# Patient Record
Sex: FEMALE | Race: WHITE | NOT HISPANIC OR LATINO | Employment: FULL TIME | URBAN - METROPOLITAN AREA
[De-identification: names, ages, dates, MRNs, and addresses within clinical notes are randomized per-mention and may not be internally consistent; named-entity substitution may affect disease eponyms.]

---

## 2023-08-29 ENCOUNTER — OFFICE VISIT (OUTPATIENT)
Dept: PRIMARY CARE | Facility: CLINIC | Age: 38
End: 2023-08-29
Payer: COMMERCIAL

## 2023-08-29 ENCOUNTER — LAB (OUTPATIENT)
Dept: LAB | Facility: LAB | Age: 38
End: 2023-08-29
Payer: COMMERCIAL

## 2023-08-29 VITALS
HEIGHT: 66 IN | BODY MASS INDEX: 21.86 KG/M2 | DIASTOLIC BLOOD PRESSURE: 74 MMHG | SYSTOLIC BLOOD PRESSURE: 130 MMHG | WEIGHT: 136 LBS

## 2023-08-29 DIAGNOSIS — Z00.00 HEALTH CARE MAINTENANCE: ICD-10-CM

## 2023-08-29 DIAGNOSIS — M85.80 OSTEOPENIA, UNSPECIFIED LOCATION: ICD-10-CM

## 2023-08-29 DIAGNOSIS — R53.83 OTHER FATIGUE: ICD-10-CM

## 2023-08-29 DIAGNOSIS — Z00.00 HEALTHCARE MAINTENANCE: ICD-10-CM

## 2023-08-29 DIAGNOSIS — R53.83 OTHER FATIGUE: Primary | ICD-10-CM

## 2023-08-29 DIAGNOSIS — D64.9 ANEMIA, UNSPECIFIED TYPE: ICD-10-CM

## 2023-08-29 DIAGNOSIS — Z00.00 ENCOUNTER FOR PREVENTIVE HEALTH EXAMINATION: ICD-10-CM

## 2023-08-29 LAB
ALANINE AMINOTRANSFERASE (SGPT) (U/L) IN SER/PLAS: 9 U/L (ref 7–45)
ALBUMIN (G/DL) IN SER/PLAS: 4.6 G/DL (ref 3.4–5)
ALKALINE PHOSPHATASE (U/L) IN SER/PLAS: 39 U/L (ref 33–110)
ANION GAP IN SER/PLAS: 12 MMOL/L (ref 10–20)
ASPARTATE AMINOTRANSFERASE (SGOT) (U/L) IN SER/PLAS: 13 U/L (ref 9–39)
BILIRUBIN TOTAL (MG/DL) IN SER/PLAS: 0.6 MG/DL (ref 0–1.2)
CALCIUM (MG/DL) IN SER/PLAS: 9.5 MG/DL (ref 8.6–10.6)
CARBON DIOXIDE, TOTAL (MMOL/L) IN SER/PLAS: 28 MMOL/L (ref 21–32)
CHLORIDE (MMOL/L) IN SER/PLAS: 102 MMOL/L (ref 98–107)
COBALAMIN (VITAMIN B12) (PG/ML) IN SER/PLAS: 335 PG/ML (ref 211–911)
CREATININE (MG/DL) IN SER/PLAS: 0.9 MG/DL (ref 0.5–1.05)
ERYTHROCYTE DISTRIBUTION WIDTH (RATIO) BY AUTOMATED COUNT: 11.9 % (ref 11.5–14.5)
ERYTHROCYTE MEAN CORPUSCULAR HEMOGLOBIN CONCENTRATION (G/DL) BY AUTOMATED: 35.4 G/DL (ref 32–36)
ERYTHROCYTE MEAN CORPUSCULAR VOLUME (FL) BY AUTOMATED COUNT: 91 FL (ref 80–100)
ERYTHROCYTES (10*6/UL) IN BLOOD BY AUTOMATED COUNT: 4.2 X10E12/L (ref 4–5.2)
ESTIMATED AVERAGE GLUCOSE FOR HBA1C: 94 MG/DL
FERRITIN (UG/LL) IN SER/PLAS: 66 UG/L (ref 8–150)
GFR FEMALE: 83 ML/MIN/1.73M2
GLUCOSE (MG/DL) IN SER/PLAS: 85 MG/DL (ref 74–99)
HEMATOCRIT (%) IN BLOOD BY AUTOMATED COUNT: 38.4 % (ref 36–46)
HEMOGLOBIN (G/DL) IN BLOOD: 13.6 G/DL (ref 12–16)
HEMOGLOBIN A1C/HEMOGLOBIN TOTAL IN BLOOD: 4.9 %
IRON (UG/DL) IN SER/PLAS: 92 UG/DL (ref 35–150)
IRON BINDING CAPACITY (UG/DL) IN SER/PLAS: 286 UG/DL (ref 240–445)
IRON SATURATION (%) IN SER/PLAS: 32 % (ref 25–45)
LEUKOCYTES (10*3/UL) IN BLOOD BY AUTOMATED COUNT: 6.5 X10E9/L (ref 4.4–11.3)
NRBC (PER 100 WBCS) BY AUTOMATED COUNT: 0 /100 WBC (ref 0–0)
PLATELETS (10*3/UL) IN BLOOD AUTOMATED COUNT: 150 X10E9/L (ref 150–450)
POTASSIUM (MMOL/L) IN SER/PLAS: 4.5 MMOL/L (ref 3.5–5.3)
PROTEIN TOTAL: 6.8 G/DL (ref 6.4–8.2)
SODIUM (MMOL/L) IN SER/PLAS: 137 MMOL/L (ref 136–145)
THYROTROPIN (MIU/L) IN SER/PLAS BY DETECTION LIMIT <= 0.05 MIU/L: 1.88 MIU/L (ref 0.44–3.98)
UREA NITROGEN (MG/DL) IN SER/PLAS: 16 MG/DL (ref 6–23)

## 2023-08-29 PROCEDURE — 83550 IRON BINDING TEST: CPT

## 2023-08-29 PROCEDURE — 82607 VITAMIN B-12: CPT

## 2023-08-29 PROCEDURE — 36415 COLL VENOUS BLD VENIPUNCTURE: CPT

## 2023-08-29 PROCEDURE — 84443 ASSAY THYROID STIM HORMONE: CPT

## 2023-08-29 PROCEDURE — 80053 COMPREHEN METABOLIC PANEL: CPT

## 2023-08-29 PROCEDURE — 83540 ASSAY OF IRON: CPT

## 2023-08-29 PROCEDURE — 99396 PREV VISIT EST AGE 40-64: CPT | Performed by: STUDENT IN AN ORGANIZED HEALTH CARE EDUCATION/TRAINING PROGRAM

## 2023-08-29 PROCEDURE — 85027 COMPLETE CBC AUTOMATED: CPT

## 2023-08-29 PROCEDURE — 82728 ASSAY OF FERRITIN: CPT

## 2023-08-29 PROCEDURE — 1036F TOBACCO NON-USER: CPT | Performed by: STUDENT IN AN ORGANIZED HEALTH CARE EDUCATION/TRAINING PROGRAM

## 2023-08-29 PROCEDURE — 83036 HEMOGLOBIN GLYCOSYLATED A1C: CPT

## 2023-08-29 NOTE — PROGRESS NOTES
"Subjective   Patient ID: Karyn Mcgill is a 38 y.o. female who presents for Annual Exam.    HPI   Yearly physical.    She feels well.    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    Exercising regularly, runs.    She feels fatigued at end of the day. Has 4 kids, works full time, very active. Mood is generally good.  Review of Systems  12-point ROS reviewed and was negative unless otherwise noted in HPI.    Objective   /74   Ht 1.676 m (5' 6\")   Wt 61.7 kg (136 lb)   BMI 21.95 kg/m²     EXAM:  GEN: conversant, NAD  HEENT: PERRL, EOMI, MMM, OP clear  NECK: supple, no carotid bruits appreciated b/l  CV: S1, S2, RRR  PULM: CTAB  ABD: soft, NT, ND  NEURO: no new gross focal deficits  EXT: no sig LE edema  PSYCH: appropriate affect      Assessment/Plan     #Osteopenia: She takes calcium+D, check vit D level.     #Asthma: Stable.     #HLD: Mild, discussed lifestyle modifications.      #Scoliosis: follows with chiropractor    #Fatigue: check routine labs including CBC, iron studies, B12, TSH.     #Health maintenance: Mammogram ordered. TDaP in 2015. Advised yearly flu shot. Received COVID-19 vaccine. Seeing GYN for routine care. Routine labs.     RTC 1 year     "

## 2024-03-25 ENCOUNTER — OFFICE VISIT (OUTPATIENT)
Dept: PRIMARY CARE | Facility: CLINIC | Age: 39
End: 2024-03-25
Payer: COMMERCIAL

## 2024-03-25 VITALS
HEIGHT: 66 IN | WEIGHT: 134 LBS | DIASTOLIC BLOOD PRESSURE: 78 MMHG | SYSTOLIC BLOOD PRESSURE: 109 MMHG | BODY MASS INDEX: 21.53 KG/M2

## 2024-03-25 DIAGNOSIS — R05.9 COUGH, UNSPECIFIED TYPE: ICD-10-CM

## 2024-03-25 DIAGNOSIS — H02.849 SWELLING OF EYELID, UNSPECIFIED LATERALITY: Primary | ICD-10-CM

## 2024-03-25 DIAGNOSIS — J06.9 VIRAL URI: ICD-10-CM

## 2024-03-25 PROCEDURE — 1036F TOBACCO NON-USER: CPT | Performed by: STUDENT IN AN ORGANIZED HEALTH CARE EDUCATION/TRAINING PROGRAM

## 2024-03-25 PROCEDURE — 99213 OFFICE O/P EST LOW 20 MIN: CPT | Performed by: STUDENT IN AN ORGANIZED HEALTH CARE EDUCATION/TRAINING PROGRAM

## 2024-03-25 NOTE — PROGRESS NOTES
"Subjective   Patient ID: Karyn Mcgill is a 38 y.o. female who presents for Facial Swelling (Swollen eyes, x 1 day/Previously had cough, x 2 weeks (On Zpak, prednisone)).    HPI   Patient presenting for swollen eyes. Has had cough x2 weeks. Called [facility]; started on Zpac, Prednisone, Teschuckie Holmanes. Developed puffiness/redness under eyebrows on day 4 of course. Stopped all meds and didn't take any of the fifth day medications. Then had swelling under her eyes. Called her health service again and they advised benadryl/pepcid, which she did. Has not eaten anything unusually, doesn't usually take medications, has not changed her make up recently.     After benadryl/pepcid, swelling has improved, but still has some numbness under eyes. Denied any breathing difficulties, other rash or urticaria, diarrhea/GI upset, fevers. Denies visual changes or disturbances. Symptoms are markedly improved today compared with yesterday.    Review of Systems  12 point ROS reviewed and otherwise negative except as stated above.    Objective   Ht 1.676 m (5' 6\")   Wt 60.8 kg (134 lb)   BMI 21.63 kg/m²     Physical Exam  Gen: NAD  Eyes: PERRLA, EOMI  Card: RRR  Pulm: no inc WOB  GI: Soft, ND, NT  Ext: 5/5 strength b/l  Neuro: Aox3, following commands  Psych: appropriate affect/demeanor    Assessment/Plan   #Cough - likely viral URI, but tx for asthma exacerbation, symptoms resolved  #Eye Swelling - possible allergic reaction; appears resolved with Benadryl/Pepcid, advised that she can call the office for further/return of symptoms and/or see her eye doctor; no further interventions at this time    Tom Nuno  [facility] PGY3    Trainee role: Resident    I saw and evaluated the patient. I personally obtained the key and critical portions of the history and physical exam or was physically present for key and critical portions performed by the trainee. I reviewed the trainee's documentation and discussed the patient with the " trainee. I agree with the trainee's medical decision making as documented on the trainee's notes.    Elfego Brannon M.D.

## 2024-08-27 ENCOUNTER — HOSPITAL ENCOUNTER (OUTPATIENT)
Dept: RADIOLOGY | Facility: CLINIC | Age: 39
Discharge: HOME | End: 2024-08-27
Payer: COMMERCIAL

## 2024-08-27 VITALS — WEIGHT: 134 LBS | BODY MASS INDEX: 21.53 KG/M2 | HEIGHT: 66 IN

## 2024-08-27 DIAGNOSIS — Z12.31 SCREENING MAMMOGRAM FOR BREAST CANCER: ICD-10-CM

## 2024-08-27 PROCEDURE — 77067 SCR MAMMO BI INCL CAD: CPT

## 2025-06-29 NOTE — PROGRESS NOTES
No chief complaint on file.      Consulting physician: Elfego Brannon MD    A report with my findings and recommendations will be sent to the primary and referring physician via written or electronic means when information is available    History of Present Illness:  Karyn Mcgill is a 39 y.o. female runner who presented on 06/30/2025 with L leg pain   Runs 3-4 times per week. Did some walking and sprinting.  After shower noted discomfort in achilles.  Worried it would pop.  Iced. Burned her skin with ice.  Pain persists. Took off Thursday, friday  hour walk 6/29 noted mod discomfort.  [place] [employer].  Didn't feel right.  Ok this am, but if she changes direction, she will feel something.     Calcium, vit D,       Past [facility] HX:  Specialty Problems    None       ROS  12 point ROS reviewed and is negative except for items listed   None  Has had cardiology work up due to chest pain with running - ecg normal.  Echo mitral valve proplapse with mild regurg.     First menses age 11  12/12    Social Hx:  Home:  , 4 kids - 2 boys, 2 girls  Sports: running, strength training  Teacher   tobacco use (any type) no  Alcohol use: no    Medications: Medications Ordered Prior to Encounter[1]      Allergies:  Allergies[2]     Physical Exam:    Visit Vitals  OB Status Having periods   Smoking Status Never      General appearance: Well-appearing well-nourished  Psych: Normal mood and affect    Neuro: Normal sensation to light touch throughout the involved extremities  Vascular: No extremity edema or discoloration.  Skin: negative.  Lymphatic: no regional lymphadenopathy present.  Eyes: no conjunctival injection.    BILATERAL   Lower Leg / Ankle / Foot Exam    Inspection:   Pes planus: None  Pes cavus: None  Deformity: None  Soft tissue swelling: None R, mild prox achilles.   Erythema: None  Ecchymosis: None  Calf atrophy: None    Range of motion:  Inversion (20-35) full, pain free  Eversion (5-25) full, pain  free  Dorsiflexion (20-30) full, pain free  Plantarflexion (40-50) full, pain free  Adduction foot full, pain free  Abduction foot full, pain free    Palpation:  TTP ATFL No  TTP CFL No  TTP Deltoid ligament No  TTP Syndesmosis No  TTP Anterior joint line No  TTP Medial malleolus No  TTP Lateral malleolus No  TTP Tibia No  TTP Fibula No  TTP Talus No  TTP Calcaneus No  TTP Base of the fifth metatarsal No  TTP Navicular No  TTP Cuboid No  TTP Cuneiforms No  TTP Metatarsals No  TTP Phalanges No    TTP Lis franc joint No  TTP MTP joints No  TTP IP joints No    TTP Achilles No R, very mild L at mus-tend junction  TTP Peroneal tendon No  TTP Posterior tibialis No  TTP Anterior tibialis No  TTP Extensor hallucis No  TTP Extensor tendons No  TTP Flexor hallucis longus No  TTP Sinus tarsi No  TTP Plantar fascia No    Strength:  Dorsiflexion no pain, 5/5  Plantarflexion no pain, 5/5  Inversion no pain, 5/5  Eversion  no pain, 5/5  Flexion MTP joints no pain, 5/5  Extension MTP joints no pain, 5/5  Flexion IP joints no pain, 5/5  Extension IP joints no pain, 5/5    Hip flexion no pain, 5/5  Hip extension no pain, 5/5  Hip abduction no pain, 5/5  Hip adduction no pain, 5/5  Hamstring no pain, 5/5  Quadriceps no pain, 5/5    Ligament Tests:  Anterior drawer: negative  Talar tilt: negative  Foot external rotation test: negative  Tibia-fibula squeeze test: negative    Special Tests  Calcaneal squeeze: negative  Forefoot squeeze: neg  Forced passive dorsiflexion (anterior impingement): neg  Johnson test: neg  Tinel's: neg at fibular head     Flexibility:   dorsiflexes to 5 deg pastu neutral R, neutral L  popliteal angle 5 bilat    Functional Exam:  Proprioception: fair  Single leg toe raises: Pain L post- lateral    Hop test: no pain R, mild L - post-lateral  Hop test: no loss of jump height  Trendelenburg: -  SL squats: valgus: bilat  SL squats: pronation: no    walking on toes: no pain  walking on heels: no pain    Gait  non-antalgic       Imaging:  L ankle: no evidence bone stress injury or arthritis        Imaging was personally interpreted and reviewed with the patient and/or family    Impression and Plan:  Karyn Mcgill is a 39 y.o. female runner/  who presented on 06/30/2025  with L ankle that is most consistent with achilles strain / peroneal tendinitis less likely early distal fibula stress fracture.  With functional testing has mild pain at achilles and peroneal    Objective: dec proprio, valgus with SL squats bilat, pain SL toe raises and hops L, post lateral, min TTP prox achilles     Plan:   440 mg naproxen twice a day for 10 days  Heel cups  HEP - start with balance and strength exercises, add eccentric loading after pain resolves.  Activity as tolerated  Consider repeat radiographs for eval for bone stress injury if pain not improving. Can also start formal PT if desired.         ** Please excuse any errors in grammar or translation related to this dictation. Voice recognition software was utilized to prepare this document. **         [1]   No current outpatient medications on file prior to visit.     No current facility-administered medications on file prior to visit.   [2]   Allergies  Allergen Reactions    Penicillins Hives

## 2025-06-30 ENCOUNTER — HOSPITAL ENCOUNTER (OUTPATIENT)
Dept: RADIOLOGY | Facility: HOSPITAL | Age: 40
Discharge: HOME | End: 2025-06-30
Payer: COMMERCIAL

## 2025-06-30 ENCOUNTER — OFFICE VISIT (OUTPATIENT)
Dept: SPORTS MEDICINE | Facility: HOSPITAL | Age: 40
End: 2025-06-30
Payer: COMMERCIAL

## 2025-06-30 VITALS — WEIGHT: 135 LBS | HEIGHT: 66 IN | HEART RATE: 60 BPM | OXYGEN SATURATION: 98 % | BODY MASS INDEX: 21.69 KG/M2

## 2025-06-30 DIAGNOSIS — M25.572 PAIN IN JOINT, ANKLE AND FOOT, LEFT: Primary | ICD-10-CM

## 2025-06-30 DIAGNOSIS — M67.88 PERONEAL TENDINOSIS, LEFT: ICD-10-CM

## 2025-06-30 DIAGNOSIS — S86.012A STRAIN OF ACHILLES TENDON, LEFT, INITIAL ENCOUNTER: ICD-10-CM

## 2025-06-30 DIAGNOSIS — M25.572 PAIN IN JOINT, ANKLE AND FOOT, LEFT: ICD-10-CM

## 2025-06-30 PROCEDURE — 99202 OFFICE O/P NEW SF 15 MIN: CPT | Performed by: PEDIATRICS

## 2025-06-30 PROCEDURE — 1036F TOBACCO NON-USER: CPT | Performed by: PEDIATRICS

## 2025-06-30 PROCEDURE — 99204 OFFICE O/P NEW MOD 45 MIN: CPT | Performed by: PEDIATRICS

## 2025-06-30 PROCEDURE — 73610 X-RAY EXAM OF ANKLE: CPT | Mod: LEFT SIDE | Performed by: STUDENT IN AN ORGANIZED HEALTH CARE EDUCATION/TRAINING PROGRAM

## 2025-06-30 PROCEDURE — 3008F BODY MASS INDEX DOCD: CPT | Performed by: PEDIATRICS

## 2025-06-30 PROCEDURE — 73610 X-RAY EXAM OF ANKLE: CPT | Mod: LT

## 2025-06-30 ASSESSMENT — PAIN SCALES - GENERAL: PAINLEVEL_OUTOF10: 6

## 2025-06-30 ASSESSMENT — PAIN - FUNCTIONAL ASSESSMENT: PAIN_FUNCTIONAL_ASSESSMENT: 0-10

## 2025-06-30 NOTE — LETTER
June 30, 2025     Elfego Brannon MD  [address]  [facility] [place] [address]    Patient: Karyn Mcgill   YOB: 1985   Date of Visit: 6/30/2025       Dear Dr. Elfego Brannon MD:    Thank you for referring Karyn Mcgill to me for evaluation. Below are my notes for this consultation.  If you have questions, please do not hesitate to call me. I look forward to following your patient along with you.       Sincerely,     Nancy Lorenzo MD      CC: No Recipients  ______________________________________________________________________________________    No chief complaint on file.      Consulting physician: Elfego Brannon MD    A report with my findings and recommendations will be sent to the primary and referring physician via written or electronic means when information is available    History of Present Illness:  Karyn Mcgill is a 39 y.o. female runner who presented on 06/30/2025 with L leg pain   Runs 3-4 times per week. Did some walking and sprinting.  After shower noted discomfort in achilles.  Worried it would pop.  Iced. Burned her skin with ice.  Pain persists. Took off Thursday, friday  hour walk 6/29 noted mod discomfort.  In pm at [employer] [place].  Didn't feel right.  Ok this am, but if she changes direction, she will feel something.     Calcium, vit D,       Past MSK HX:  Specialty Problems    None       ROS  12 point ROS reviewed and is negative except for items listed   None  Has had cardiology work up due to chest pain with running - ecg normal.  Echo mitral valve proplapse with mild regurg.     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  Skin: negative.  Lymphatic: no regional lymphadenopathy present.  Eyes: no conjunctival injection.    BILATERAL   Lower Leg / Ankle / Foot Exam    Inspection:   Pes planus: None  Pes cavus: None  Deformity: None  Soft tissue swelling: None R, mild prox achilles.   Erythema: None  Ecchymosis: None  Calf atrophy: None    Range of motion:  Inversion (20-35) full, pain free  Eversion (5-25) full, pain free  Dorsiflexion (20-30) full, pain free  Plantarflexion (40-50) full, pain free  Adduction foot full, pain free  Abduction foot full, pain free    Palpation:  TTP ATFL No  TTP CFL No  TTP Deltoid ligament No  TTP Syndesmosis No  TTP Anterior joint line No  TTP Medial malleolus No  TTP Lateral malleolus No  TTP Tibia No  TTP Fibula No  TTP Talus No  TTP Calcaneus No  TTP Base of the fifth metatarsal No  TTP Navicular No  TTP Cuboid No  TTP Cuneiforms No  TTP Metatarsals No  TTP Phalanges No    TTP Lis franc joint No  TTP MTP joints No  TTP IP joints No    TTP Achilles No R, very mild L at mus-tend junction  TTP Peroneal tendon No  TTP Posterior tibialis No  TTP Anterior tibialis No  TTP Extensor hallucis No  TTP Extensor tendons No  TTP Flexor hallucis longus No  TTP Sinus tarsi No  TTP Plantar fascia No    Strength:  Dorsiflexion no pain, 5/5  Plantarflexion no pain, 5/5  Inversion no pain, 5/5  Eversion  no pain, 5/5  Flexion MTP joints no pain, 5/5  Extension MTP joints no pain, 5/5  Flexion IP joints no pain, 5/5  Extension IP joints no pain, 5/5    Hip flexion no pain, 5/5  Hip extension no pain, 5/5  Hip abduction no pain, 5/5  Hip adduction no pain, 5/5  Hamstring no pain, 5/5  Quadriceps no pain, 5/5    Ligament Tests:  Anterior drawer: negative  Talar tilt: negative  Foot external rotation test:  negative  Tibia-fibula squeeze test: negative    Special Tests  Calcaneal squeeze: negative  Forefoot squeeze: neg  Forced passive dorsiflexion (anterior impingement): neg  Johnson test: neg  Tinel's: neg at fibular head     Flexibility:   dorsiflexes to 5 deg pastu neutral R, neutral L  popliteal angle 5 bilat    Functional Exam:  Proprioception: fair  Single leg toe raises: Pain L post- lateral    Hop test: no pain R, mild L - post-lateral  Hop test: no loss of jump height  Trendelenburg: -  SL squats: valgus: bilat  SL squats: pronation: no    walking on toes: no pain  walking on heels: no pain    Gait non-antalgic       Imaging:  L ankle: no evidence bone stress injury or arthritis        Imaging was personally interpreted and reviewed with the patient and/or family    Impression and Plan:  Karyn Mcgill is a 39 y.o. female runner/  who presented on 06/30/2025  with L ankle that is most consistent with achilles strain / peroneal tendinitis less likely early distal fibula stress fracture.  With functional testing has mild pain at achilles and peroneal    Objective: dec proprio, valgus with SL squats bilat, pain SL toe raises and hops L, post lateral, min TTP prox achilles     Plan:   440 mg naproxen twice a day for 10 days  Heel cups  HEP - start with balance and strength exercises, add eccentric loading after pain resolves.  Activity as tolerated  Consider repeat radiographs for eval for bone stress injury if pain not improving. Can also start formal PT if desired.         ** Please excuse any errors in grammar or translation related to this dictation. Voice recognition software was utilized to prepare this document. **         [1]  No current outpatient medications on file prior to visit.     No current facility-administered medications on file prior to visit.   [2]  Allergies  Allergen Reactions   • Penicillins Hives

## 2025-07-07 ASSESSMENT — PROMIS GLOBAL HEALTH SCALE
RATE_MENTAL_HEALTH: VERY GOOD
RATE_PHYSICAL_HEALTH: VERY GOOD
RATE_AVERAGE_PAIN: 1
RATE_AVERAGE_FATIGUE: MODERATE
EMOTIONAL_PROBLEMS: RARELY
RATE_GENERAL_HEALTH: VERY GOOD
RATE_QUALITY_OF_LIFE: VERY GOOD
RATE_SOCIAL_SATISFACTION: VERY GOOD
CARRYOUT_PHYSICAL_ACTIVITIES: MOSTLY
CARRYOUT_SOCIAL_ACTIVITIES: VERY GOOD

## 2025-07-09 ENCOUNTER — OFFICE VISIT (OUTPATIENT)
Dept: PRIMARY CARE | Facility: CLINIC | Age: 40
End: 2025-07-09
Payer: COMMERCIAL

## 2025-07-09 VITALS
DIASTOLIC BLOOD PRESSURE: 64 MMHG | BODY MASS INDEX: 21.86 KG/M2 | SYSTOLIC BLOOD PRESSURE: 106 MMHG | HEIGHT: 66 IN | WEIGHT: 136 LBS

## 2025-07-09 DIAGNOSIS — M85.80 OSTEOPENIA, UNSPECIFIED LOCATION: ICD-10-CM

## 2025-07-09 DIAGNOSIS — J45.909 ASTHMA, UNSPECIFIED ASTHMA SEVERITY, UNSPECIFIED WHETHER COMPLICATED, UNSPECIFIED WHETHER PERSISTENT (HHS-HCC): ICD-10-CM

## 2025-07-09 DIAGNOSIS — E55.9 VITAMIN D DEFICIENCY: ICD-10-CM

## 2025-07-09 DIAGNOSIS — Z12.31 ENCOUNTER FOR SCREENING MAMMOGRAM FOR MALIGNANT NEOPLASM OF BREAST: Primary | ICD-10-CM

## 2025-07-09 DIAGNOSIS — E78.2 MIXED HYPERLIPIDEMIA: ICD-10-CM

## 2025-07-09 DIAGNOSIS — Z00.00 ENCOUNTER FOR PREVENTIVE HEALTH EXAMINATION: ICD-10-CM

## 2025-07-09 DIAGNOSIS — Z00.00 HEALTHCARE MAINTENANCE: ICD-10-CM

## 2025-07-09 PROCEDURE — 3008F BODY MASS INDEX DOCD: CPT | Performed by: STUDENT IN AN ORGANIZED HEALTH CARE EDUCATION/TRAINING PROGRAM

## 2025-07-09 PROCEDURE — 99396 PREV VISIT EST AGE 40-64: CPT | Performed by: STUDENT IN AN ORGANIZED HEALTH CARE EDUCATION/TRAINING PROGRAM

## 2025-07-09 PROCEDURE — 1036F TOBACCO NON-USER: CPT | Performed by: STUDENT IN AN ORGANIZED HEALTH CARE EDUCATION/TRAINING PROGRAM

## 2025-07-09 ASSESSMENT — PATIENT HEALTH QUESTIONNAIRE - PHQ9
SUM OF ALL RESPONSES TO PHQ9 QUESTIONS 1 AND 2: 0
1. LITTLE INTEREST OR PLEASURE IN DOING THINGS: NOT AT ALL
2. FEELING DOWN, DEPRESSED OR HOPELESS: NOT AT ALL

## 2025-07-09 NOTE — PROGRESS NOTES
"Subjective   Patient ID: Karyn Mcgill is a 40 y.o. female who presents for Annual Exam.    HPI   Yearly physical.    Karyn is recovering from an achilles tendon strain, this is feeling much better after rest. She saw sports medicine for this, is doing some stretching and exercises.    She has 4 kids, works full time as . Life is busy, some understandable stress and anxiety. Oldest child turned 16 and is now driving. Patient thinks she manages stress well, though she feels tired all the time. Normally is running for exercise.    She is seeing dermatology for bald spot on her head, got injections for alopecia.  Review of Systems  12-point ROS reviewed and was negative unless otherwise noted in HPI.    Objective   Ht 1.676 m (5' 6\")   Wt 61.7 kg (136 lb)   BMI 21.95 kg/m²     Physical Exam  GEN: conversant, NAD  HEENT: PERRL, EOMI, MMM  NECK: supple, no carotid bruits appreciated b/l  CV: S1, S2, RRR  PULM: CTAB  ABD: soft, NT, ND  NEURO: no new gross focal deficits  EXT: no sig LE edema  PSYCH: appropriate affect    Assessment/Plan     #Anxiety/stress: patient is managing well, does not want to try SSRI at this time.    #Osteopenia: She takes calcium+D, check vit D level. DEXA ordered for surveillance.     #Asthma: Stable.     #HLD: Mild, discussed lifestyle modifications.     #Scoliosis: follows with chiropractor     #Fatigue: check routine labs     #alopecia: seeing dermatology     #Health maintenance: Mammogram ordered. TDaP in 2015. Advised yearly flu shot. Received COVID-19 vaccine. Seeing GYN for routine care. Routine labs.     RTC 1 year      "

## 2025-07-10 ENCOUNTER — HOSPITAL ENCOUNTER (OUTPATIENT)
Dept: RADIOLOGY | Facility: CLINIC | Age: 40
Discharge: HOME | End: 2025-07-10
Payer: COMMERCIAL

## 2025-07-10 DIAGNOSIS — M85.80 OSTEOPENIA, UNSPECIFIED LOCATION: ICD-10-CM

## 2025-07-10 LAB
25(OH)D3+25(OH)D2 SERPL-MCNC: 40 NG/ML (ref 30–100)
ALBUMIN SERPL-MCNC: 4.7 G/DL (ref 3.6–5.1)
ALP SERPL-CCNC: 36 U/L (ref 31–125)
ALT SERPL-CCNC: 14 U/L (ref 6–29)
ANION GAP SERPL CALCULATED.4IONS-SCNC: 9 MMOL/L (CALC) (ref 7–17)
AST SERPL-CCNC: 28 U/L (ref 10–30)
BILIRUB SERPL-MCNC: 1.1 MG/DL (ref 0.2–1.2)
BUN SERPL-MCNC: 14 MG/DL (ref 7–25)
CALCIUM SERPL-MCNC: 9.8 MG/DL (ref 8.6–10.2)
CHLORIDE SERPL-SCNC: 104 MMOL/L (ref 98–110)
CHOLEST SERPL-MCNC: 206 MG/DL
CHOLEST/HDLC SERPL: 3 (CALC)
CO2 SERPL-SCNC: 26 MMOL/L (ref 20–32)
CREAT SERPL-MCNC: 0.8 MG/DL (ref 0.5–0.99)
EGFRCR SERPLBLD CKD-EPI 2021: 94 ML/MIN/1.73M2
ERYTHROCYTE [DISTWIDTH] IN BLOOD BY AUTOMATED COUNT: 12 % (ref 11–15)
EST. AVERAGE GLUCOSE BLD GHB EST-MCNC: 105 MG/DL
EST. AVERAGE GLUCOSE BLD GHB EST-SCNC: 5.8 MMOL/L
GLUCOSE SERPL-MCNC: 95 MG/DL (ref 65–139)
HBA1C MFR BLD: 5.3 %
HCT VFR BLD AUTO: 41.2 % (ref 35–45)
HDLC SERPL-MCNC: 68 MG/DL
HGB BLD-MCNC: 13.6 G/DL (ref 11.7–15.5)
LDLC SERPL CALC-MCNC: 119 MG/DL (CALC)
MCH RBC QN AUTO: 30.8 PG (ref 27–33)
MCHC RBC AUTO-ENTMCNC: 33 G/DL (ref 32–36)
MCV RBC AUTO: 93.2 FL (ref 80–100)
NONHDLC SERPL-MCNC: 138 MG/DL (CALC)
PLATELET # BLD AUTO: 139 THOUSAND/UL (ref 140–400)
PMV BLD REES-ECKER: 12.8 FL (ref 7.5–12.5)
POTASSIUM SERPL-SCNC: 4.3 MMOL/L (ref 3.5–5.3)
PROT SERPL-MCNC: 7 G/DL (ref 6.1–8.1)
RBC # BLD AUTO: 4.42 MILLION/UL (ref 3.8–5.1)
SODIUM SERPL-SCNC: 139 MMOL/L (ref 135–146)
TRIGL SERPL-MCNC: 90 MG/DL
TSH SERPL-ACNC: 1.07 MIU/L
WBC # BLD AUTO: 5.6 THOUSAND/UL (ref 3.8–10.8)

## 2025-07-10 PROCEDURE — 77080 DXA BONE DENSITY AXIAL: CPT

## 2025-07-10 PROCEDURE — 77080 DXA BONE DENSITY AXIAL: CPT | Performed by: RADIOLOGY

## 2025-08-05 ENCOUNTER — TELEPHONE (OUTPATIENT)
Dept: PRIMARY CARE | Facility: CLINIC | Age: 40
End: 2025-08-05
Payer: COMMERCIAL

## 2025-08-15 ENCOUNTER — OFFICE VISIT (OUTPATIENT)
Dept: OBSTETRICS AND GYNECOLOGY | Facility: CLINIC | Age: 40
End: 2025-08-15
Payer: COMMERCIAL

## 2025-08-15 VITALS
BODY MASS INDEX: 22.18 KG/M2 | HEIGHT: 66 IN | DIASTOLIC BLOOD PRESSURE: 76 MMHG | HEART RATE: 58 BPM | WEIGHT: 138 LBS | SYSTOLIC BLOOD PRESSURE: 113 MMHG

## 2025-08-15 DIAGNOSIS — Z01.419 ENCOUNTER FOR ANNUAL ROUTINE GYNECOLOGICAL EXAMINATION: Primary | ICD-10-CM

## 2025-08-15 PROCEDURE — 99396 PREV VISIT EST AGE 40-64: CPT | Performed by: OBSTETRICS & GYNECOLOGY

## 2025-08-15 PROCEDURE — 1036F TOBACCO NON-USER: CPT | Performed by: OBSTETRICS & GYNECOLOGY

## 2025-08-15 PROCEDURE — 3008F BODY MASS INDEX DOCD: CPT | Performed by: OBSTETRICS & GYNECOLOGY

## 2025-08-15 RX ORDER — CLOBETASOL PROPIONATE 0.5 MG/ML
SOLUTION TOPICAL
COMMUNITY
Start: 2025-07-08

## 2025-08-29 ENCOUNTER — HOSPITAL ENCOUNTER (OUTPATIENT)
Dept: RADIOLOGY | Facility: CLINIC | Age: 40
Discharge: HOME | End: 2025-08-29
Payer: COMMERCIAL

## 2025-08-29 DIAGNOSIS — Z12.31 ENCOUNTER FOR SCREENING MAMMOGRAM FOR MALIGNANT NEOPLASM OF BREAST: ICD-10-CM

## 2025-08-29 PROCEDURE — 77067 SCR MAMMO BI INCL CAD: CPT

## 2025-09-03 ENCOUNTER — TELEPHONE (OUTPATIENT)
Dept: PRIMARY CARE | Facility: CLINIC | Age: 40
End: 2025-09-03
Payer: COMMERCIAL

## 2025-09-03 DIAGNOSIS — R92.8 ABNORMAL MAMMOGRAM: Primary | ICD-10-CM

## 2025-09-11 ENCOUNTER — APPOINTMENT (OUTPATIENT)
Dept: OBSTETRICS AND GYNECOLOGY | Facility: CLINIC | Age: 40
End: 2025-09-11
Payer: COMMERCIAL